# Patient Record
Sex: FEMALE | Race: WHITE | NOT HISPANIC OR LATINO | Employment: FULL TIME | URBAN - METROPOLITAN AREA
[De-identification: names, ages, dates, MRNs, and addresses within clinical notes are randomized per-mention and may not be internally consistent; named-entity substitution may affect disease eponyms.]

---

## 2019-03-25 ENCOUNTER — APPOINTMENT (OUTPATIENT)
Dept: OTHER | Facility: HOSPITAL | Age: 59
End: 2019-03-25

## 2019-03-25 ENCOUNTER — CONSULT (OUTPATIENT)
Dept: ONCOLOGY | Facility: CLINIC | Age: 59
End: 2019-03-25

## 2019-03-25 VITALS
BODY MASS INDEX: 21.3 KG/M2 | RESPIRATION RATE: 14 BRPM | DIASTOLIC BLOOD PRESSURE: 80 MMHG | SYSTOLIC BLOOD PRESSURE: 123 MMHG | TEMPERATURE: 97.8 F | HEIGHT: 63 IN | OXYGEN SATURATION: 100 % | WEIGHT: 120.2 LBS | HEART RATE: 59 BPM

## 2019-03-25 DIAGNOSIS — D47.2 MGUS (MONOCLONAL GAMMOPATHY OF UNKNOWN SIGNIFICANCE): Primary | ICD-10-CM

## 2019-03-25 PROCEDURE — 99243 OFF/OP CNSLTJ NEW/EST LOW 30: CPT | Performed by: INTERNAL MEDICINE

## 2019-03-25 PROCEDURE — G0463 HOSPITAL OUTPT CLINIC VISIT: HCPCS | Performed by: INTERNAL MEDICINE

## 2019-03-25 RX ORDER — ALENDRONATE SODIUM 70 MG/1
TABLET ORAL
Refills: 5 | COMMUNITY
Start: 2019-03-19

## 2019-03-25 NOTE — PROGRESS NOTES
Subjective     REASON FOR CONSULTATION: IgM MGUS  Provide an opinion on any further workup or treatment                             REQUESTING PHYSICIAN: Pamela Randhawa MD    RECORDS OBTAINED:  Records of the patients history including those obtained from the referring provider were reviewed and summarized in detail.    HISTORY OF PRESENT ILLNESS:  The patient is a 58 y.o. year old female who is here for an opinion about the above issue.    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  In the course of evaluation of her osteoporosis and serum protein electrophoresis was sent and that showed a 0.3 g/dL IgM kappa spike and she is here today to discuss how to further follow this finding    She has no B symptoms and feels well  She is up-to-date with mammograms and colonoscopies  She has no back pain  Her mother and 1 of her sisters also has osteoporosis    She is  4 para 3 with twins and 1 miscarriage first childbirth was age 27 she breast-fed her children for at least a year    Family history is positive for mother with breast cancer in her 70s which was neglected, she has a sister who had a adenoid cystic parotid tumor that recurred 22 years later.    Past Medical History:   Diagnosis Date   • History of menorrhagia    • History of uterine fibroid         Past Surgical History:   Procedure Laterality Date   • DILATATION AND CURETTAGE     • ENDOMETRIAL ABLATION W/ NOVASURE     • HYSTERECTOMY          Current Outpatient Medications on File Prior to Visit   Medication Sig Dispense Refill   • Calcium 250 MG capsule Take  by mouth.     • VITAMIN MIXTURE PO Take 1 capsule by mouth. Ritual vitamin      • alendronate (FOSAMAX) 70 MG tablet TK 1 T PO Q WEEK  5     No current facility-administered medications  "on file prior to visit.         ALLERGIES:  No Known Allergies     Social History     Socioeconomic History   • Marital status:      Spouse name: Not on file   • Number of children: Not on file   • Years of education: Not on file   • Highest education level: Not on file   Occupational History     Employer: [employer]   Tobacco Use   • Smoking status: Never Smoker   • Smokeless tobacco: Never Used   Substance and Sexual Activity   • Alcohol use: Yes     Comment: rarely   • Drug use: No        Family History   Problem Relation Age of Onset   • Cancer Mother    • Cancer Father    • Gallbladder disease Sister    • Cancer Paternal Aunt    • Cancer Paternal Uncle         Review of Systems   Constitutional: Negative.  Negative for unexpected weight change.   Musculoskeletal: Negative for arthralgias, back pain, gait problem, joint swelling and neck pain.   All other systems reviewed and are negative.       Objective     Vitals:    03/25/19 1518   BP: 123/80   Pulse: 59   Resp: 14   Temp: 97.8 °F (36.6 °C)   SpO2: 100%   Weight: 54.5 kg (120 lb 3.2 oz)   Height: 159 cm (62.6\")  Comment: new ht w/o shoes   PainSc: 0-No pain     Current Status 3/25/2019   ECOG score 0       Physical Exam    GENERAL:  Well-developed, well-nourished in no acute distress.   SKIN:  Warm, dry without rashes, purpura or petechiae.  EYES:  Pupils equal, round and reactive to light.  EOMs intact.  Conjunctivae normal.  EARS:  Hearing intact.  NOSE:  Septum midline.  No excoriations or nasal discharge.  MOUTH:  Tongue is well-papillated; no stomatitis or ulcers.  Lips normal.  THROAT:  Oropharynx without lesions or exudates.  NECK:  Supple with good range of motion; no thyromegaly or masses, no JVD.  LYMPHATICS:  No cervical, supraclavicular, axillary or inguinal adenopathy.  CHEST:  Lungs clear to auscultation. Good airflow.  CARDIAC:  Regular rate and rhythm without murmurs, rubs or gallops. Normal S1,S2.  ABDOMEN:  " Soft, nontender with no hepatosplenomegaly or masses.  EXTREMITIES:  No clubbing, cyanosis or edema.  NEUROLOGICAL:  Cranial Nerves II-XII grossly intact.  No focal neurological deficits.  PSYCHIATRIC:  Normal affect and mood.        RECENT LABS:  Hematology No results found for: WBC, RBC, HGB, HCT, PLT           Assessment/Plan   1.  IgM kappa MGUS 0.3 g/dL asymptomatic  2.  Familial osteoporosis    Plan  1.  Quantitative immunoglobulins free light chain ratio and beta-2 microglobulin  2.  We will review these results and decide whether any further imaging is needed  I explained that IgM kappa MGUS is usually associated with low-grade lymphoma not with myeloma and in the absence of anemia and leukopenia thrombocytopenia I do not think a bone marrow is needed at this time  She may benefit from Prolia as she is been active and exercising and doing everything right and still is worsening her osteoporosis and oral bisphosphonates may not be adequate.  She will call for the results of her free light chain ratio beta-2 microglobulin and if these are all normal we will see her back in 6 months with repeat blood work

## 2019-09-09 ENCOUNTER — APPOINTMENT (OUTPATIENT)
Dept: OTHER | Facility: HOSPITAL | Age: 59
End: 2019-09-09

## 2019-09-09 ENCOUNTER — APPOINTMENT (OUTPATIENT)
Dept: ONCOLOGY | Facility: CLINIC | Age: 59
End: 2019-09-09